# Patient Record
Sex: FEMALE | Race: WHITE | NOT HISPANIC OR LATINO | URBAN - METROPOLITAN AREA
[De-identification: names, ages, dates, MRNs, and addresses within clinical notes are randomized per-mention and may not be internally consistent; named-entity substitution may affect disease eponyms.]

---

## 2019-08-12 ENCOUNTER — OUTPATIENT (OUTPATIENT)
Dept: OUTPATIENT SERVICES | Facility: HOSPITAL | Age: 69
LOS: 1 days | Discharge: HOME | End: 2019-08-12

## 2019-08-12 ENCOUNTER — RESULT REVIEW (OUTPATIENT)
Age: 69
End: 2019-08-12

## 2019-08-13 LAB — SURGICAL PATHOLOGY STUDY: SIGNIFICANT CHANGE UP

## 2022-05-24 ENCOUNTER — NON-APPOINTMENT (OUTPATIENT)
Age: 72
End: 2022-05-24

## 2022-06-09 ENCOUNTER — NON-APPOINTMENT (OUTPATIENT)
Age: 72
End: 2022-06-09

## 2022-06-18 ENCOUNTER — EMERGENCY (EMERGENCY)
Facility: HOSPITAL | Age: 72
LOS: 1 days | Discharge: HOME | End: 2022-06-18
Attending: EMERGENCY MEDICINE | Admitting: EMERGENCY MEDICINE
Payer: COMMERCIAL

## 2022-06-18 VITALS
OXYGEN SATURATION: 99 % | TEMPERATURE: 99 F | WEIGHT: 110.01 LBS | DIASTOLIC BLOOD PRESSURE: 75 MMHG | SYSTOLIC BLOOD PRESSURE: 162 MMHG | HEART RATE: 76 BPM | RESPIRATION RATE: 18 BRPM

## 2022-06-18 DIAGNOSIS — J02.9 ACUTE PHARYNGITIS, UNSPECIFIED: ICD-10-CM

## 2022-06-18 DIAGNOSIS — Z20.822 CONTACT WITH AND (SUSPECTED) EXPOSURE TO COVID-19: ICD-10-CM

## 2022-06-18 LAB
RAPID RVP RESULT: SIGNIFICANT CHANGE UP
S PYO AG SPEC QL IA: NEGATIVE — SIGNIFICANT CHANGE UP
SARS-COV-2 RNA SPEC QL NAA+PROBE: SIGNIFICANT CHANGE UP

## 2022-06-18 PROCEDURE — 87081 CULTURE SCREEN ONLY: CPT

## 2022-06-18 PROCEDURE — 99283 EMERGENCY DEPT VISIT LOW MDM: CPT

## 2022-06-18 PROCEDURE — 0225U NFCT DS DNA&RNA 21 SARSCOV2: CPT

## 2022-06-18 PROCEDURE — 99284 EMERGENCY DEPT VISIT MOD MDM: CPT

## 2022-06-18 PROCEDURE — 87880 STREP A ASSAY W/OPTIC: CPT

## 2022-06-18 RX ORDER — AMOXICILLIN 250 MG/5ML
1 SUSPENSION, RECONSTITUTED, ORAL (ML) ORAL
Qty: 20 | Refills: 0
Start: 2022-06-18 | End: 2022-06-27

## 2022-06-18 NOTE — ED PROVIDER NOTE - CLINICAL SUMMARY MEDICAL DECISION MAKING FREE TEXT BOX
70 y/o F sent in by [facility] for suspected peritonsillar abscess. On my exam, I did not suspect pt has this. Tonsils are slightly erythematous and enlarged. Uvula bilaterally is mildly edematous with no exudate and no fever in ER. Suspect most likely viral pharyngitis. Will consider strep. Discussed with pt. Will send strep and viral cultures and discuss possible Abx. Advised pt to call back this afternoon for results.     Now at f/u, no viral infection and no strep detected. Will call to advise pt to take Amoxicillin if pain is worse or still continuing. Will also review re-emergent return instructions with pt.

## 2022-06-18 NOTE — ED ADULT NURSE NOTE - OBJECTIVE STATEMENT
71y Female c/o sore throat. Airway patent, speaking clear and in full sentences. Pt reports sore throat, difficulty swallowing x "a few days", states "I have a peritonsillar abscess". Pt denies CP, dizziness, SOB, fevers, N/V/D.

## 2022-06-18 NOTE — ED PROVIDER NOTE - OBJECTIVE STATEMENT
72 y/o F, coming in to ED with sore throat x5 days. She reports her sore throat has worsen yesterday with fever of 101F. She notes some improvement since last night. She has difficulty swallowing. She reports getting COVID and strep tests a few days ago with both negative results. She was seen at [facility] where she was advised to come to ED for suspected peritonsillar abscess. She denies n/v/d and urinary sxs.

## 2022-06-18 NOTE — ED ADULT NURSE NOTE - FINAL NURSING ELECTRONIC SIGNATURE
Called Pts name a second time. Pt not in ER waiting room.
Called Pts name a third time. Pt not in ER waiting room. LWBS
Called Pts. Pt not in ER waiting room.
18-Jun-2022 10:24

## 2022-06-18 NOTE — ED ADULT TRIAGE NOTE - CHIEF COMPLAINT QUOTE
Patient c/o sore throat, fever x 1 week, difficulty swallowing since yesterday. No respiratory distress, hoarseness, hemoptysis.

## 2022-06-18 NOTE — ED PROVIDER NOTE - PHYSICAL EXAMINATION
VITAL SIGNS: I have reviewed nursing notes and confirm.  CONSTITUTIONAL: Well-developed; well-nourished; in no acute distress.  SKIN: Agree with RN documentation regarding decubitus evaluation. Remainder of skin exam is warm and dry, no acute rash.  HEAD: Normocephalic; atraumatic.  EYES: PERRL, EOM intact; conjunctiva and sclera clear.  ENT: No nasal discharge; airway clear. Tonsilar inflammation with no exudate. Slightly edematous uvula with glassy sparing edema at superior uvula bilat. Normal voice. No visible peritonsillar abscess. Normal tolerating secretions.   NECK: Supple; non tender.  CARD: S1, S2 normal; no murmurs, gallops, or rubs. Regular rate and rhythm.  RESP: No wheezes, rales or rhonchi. No stridor.   ABD: Normal bowel sounds; soft; non-distended; non-tender; no hepatosplenomegaly.  EXT: Normal ROM. No clubbing, cyanosis or edema.  LYMPH: No acute cervical adenopathy.  NEURO: Alert, oriented. Grossly unremarkable.  PSYCH: Cooperative, appropriate.

## 2022-06-18 NOTE — ED PROVIDER NOTE - PATIENT PORTAL LINK FT
You can access the FollowMyHealth Patient Portal offered by [facility] by registering at the following website: [email or web address]. By joining [facility]’s FollowMyHealth portal, you will also be able to view your health information using other applications (apps) compatible with our system.

## 2022-06-18 NOTE — ED ADULT NURSE NOTE - NSIMPLEMENTINTERV_GEN_ALL_ED
Implemented All Universal Safety Interventions:  [place] to call system. Call bell, personal items and telephone within reach. Instruct patient to call for assistance. Room bathroom lighting operational. Non-slip footwear when patient is off stretcher. Physically safe environment: no spills, clutter or unnecessary equipment. Stretcher in lowest position, wheels locked, appropriate side rails in place.

## 2022-06-18 NOTE — ED PROVIDER NOTE - NSFOLLOWUPINSTRUCTIONS_ED_ALL_ED_FT
Viral and Strep cultures were sent, Please call this afternoon and ask to speak with Dr. Feliciano. Antibiotic decisions will be made with this information    Avoid hot / spicy foods / alcohol / which irritate the inflammation  Take Ibuprofen for discomfort and inflammation  gargle with warm salt water to help decrease inflammation / swelling.         Pharyngitis    Pharyngitis is inflammation of your pharynx, which is typically caused by a viral or bacterial infection. Pharyngitis can be contagious and may spread from person to person through intimate contact, coughing, sneezing, or sharing personal items and utensils. Symptoms of pharyngitis may include sore throat, fever, headache, or swollen lymph nodes. If you are prescribed antibiotics, make sure you finish them even if you start to feel better. Gargle with salt water as often as every 1-2 hours to soothe your throat. Throat lozenges (if you are not at risk for choking) or sprays may be used to soothe your throat.    SEEK IMMEDIATE MEDICAL CARE IF YOU HAVE ANY OF THE FOLLOWING SYMPTOMS: neck stiffness, drooling, hoarseness or change in voice, inability to swallow liquids, vomiting, or trouble breathing.

## 2022-06-22 ENCOUNTER — APPOINTMENT (OUTPATIENT)
Dept: OPHTHALMOLOGY | Facility: CLINIC | Age: 72
End: 2022-06-22

## 2022-07-11 PROBLEM — Z78.9 OTHER SPECIFIED HEALTH STATUS: Chronic | Status: ACTIVE | Noted: 2022-06-18

## 2022-08-02 ENCOUNTER — NON-APPOINTMENT (OUTPATIENT)
Age: 72
End: 2022-08-02

## 2022-08-15 ENCOUNTER — APPOINTMENT (OUTPATIENT)
Dept: BREAST CENTER | Facility: CLINIC | Age: 72
End: 2022-08-15

## 2022-11-02 ENCOUNTER — APPOINTMENT (OUTPATIENT)
Dept: OPHTHALMOLOGY | Facility: CLINIC | Age: 72
End: 2022-11-02

## 2022-11-02 ENCOUNTER — NON-APPOINTMENT (OUTPATIENT)
Age: 72
End: 2022-11-02

## 2022-11-02 PROCEDURE — 92004 COMPRE OPH EXAM NEW PT 1/>: CPT

## 2023-02-15 ENCOUNTER — NON-APPOINTMENT (OUTPATIENT)
Age: 73
End: 2023-02-15

## 2023-03-17 PROBLEM — R92.8 ABNORMAL FINDING ON BREAST IMAGING: Status: ACTIVE | Noted: 2023-03-17

## 2023-03-22 ENCOUNTER — APPOINTMENT (OUTPATIENT)
Dept: BREAST CENTER | Facility: CLINIC | Age: 73
End: 2023-03-22
Payer: COMMERCIAL

## 2023-03-22 ENCOUNTER — APPOINTMENT (OUTPATIENT)
Dept: HEMATOLOGY ONCOLOGY | Facility: CLINIC | Age: 73
End: 2023-03-22

## 2023-03-22 VITALS
HEIGHT: 63 IN | WEIGHT: 114 LBS | DIASTOLIC BLOOD PRESSURE: 82 MMHG | OXYGEN SATURATION: 98 % | HEART RATE: 72 BPM | SYSTOLIC BLOOD PRESSURE: 136 MMHG | BODY MASS INDEX: 20.2 KG/M2

## 2023-03-22 DIAGNOSIS — Z80.3 FAMILY HISTORY OF MALIGNANT NEOPLASM OF BREAST: ICD-10-CM

## 2023-03-22 DIAGNOSIS — Z78.9 OTHER SPECIFIED HEALTH STATUS: ICD-10-CM

## 2023-03-22 DIAGNOSIS — R92.8 OTHER ABNORMAL AND INCONCLUSIVE FINDINGS ON DIAGNOSTIC IMAGING OF BREAST: ICD-10-CM

## 2023-03-22 DIAGNOSIS — Z00.00 ENCOUNTER FOR GENERAL ADULT MEDICAL EXAMINATION W/OUT ABNORMAL FINDINGS: ICD-10-CM

## 2023-03-22 PROCEDURE — 99204 OFFICE O/P NEW MOD 45 MIN: CPT

## 2023-03-22 NOTE — PHYSICAL EXAM
[Supple] : supple [No Supraclavicular Adenopathy] : no supraclavicular adenopathy [Examined in the supine and seated position] : examined in the supine and seated position [No dominant masses] : no dominant masses in right breast  [No Nipple Retraction] : no left nipple retraction [No Nipple Discharge] : no left nipple discharge [No Axillary Lymphadenopathy] : no left axillary lymphadenopathy [Asymmetrical] : asymmetrical

## 2023-03-29 ENCOUNTER — NON-APPOINTMENT (OUTPATIENT)
Age: 73
End: 2023-03-29

## 2023-03-29 NOTE — DISCUSSION/SUMMARY
[FreeTextEntry1] : REASON FOR CONSULT\par Wilbur Cabrera is a 72-year-old female referred by Dr. Johnny Holland for cancer genetic counseling and risk assessment due to a family history of cancer. Ms. Cabrera was seen on 2023 at which time medical and family history was ascertained and a pedigree constructed. She was accompanied by her sister, Mona.\par \par RELEVANT MEDICAL HISTORY\par Ms. Cabrera is a healthy individual with no reported history of cancer. She has a family history of cancer, see below.\par \par OTHER MEDICAL AND SURGICAL HISTORY:\par •	Medical History: no significant medical history reported\par •	Surgical History: hernia surgery\par \par OB/GYN HISTORY:\par Obstetrical History: \par Age at Menarche: 11\par Menopausal Status: Post-menopausal with LMP at age 48 \par Age at First Live Birth: N/A\par Oral Contraceptive Use: No\par Hormone Replacement Therapy: No\par \par CANCER SCREENING HISTORY:  \par Breast: \par •	Mammography: 22- wnl\par •	Sonography: 22- rec 6 month follow up for left breast lesion, 23- rec 6 month follow up\par •	MRI: No\par •	Biopsies: Yes, reportedly benign\par GYN:\par •	Pelvic Examination: Annual- reportedly wnl, history of small fibroids\par Colon:\par •	Colonoscopy: - reportedly 1 polyp identified (pathology unknown), repeat in 7 years\par Skin:  \par •	FBSE: Yes\par •	Lesions biopsied/removed: Yes- basal cell (nose), excised\par \par SOCIAL HISTORY:\par •	Tobacco-product use: Yes, former\par •	Environmental exposures: No\par \par FAMILY HISTORY:\par Maternal ancestry was reported as Eastern /[place] and paternal ancestry was reported as [place]. Maternal and paternal Ashkenazi Jainism ancestry was confirmed. A detailed family history of cancer was ascertained, see below and scanned chart for pedigree. \par \par 	Of note, Ms. Cabrera’s sister, Mona, reported a history of 3 separate breast cancers: left breast cancer diagnosed age 36 s/p lumpectomy and radiation, right invasive lobular cancer (HR+) diagnosed age 49 s/p mastectomy, left invasive tubular cancer diagnosed age 57 s/p mastectomy and endocrine therapy (exemestane). She initially pursued genetic testing in  which was negative for any variants in BRCA1/BRCA2. She has not pursued update testing and is not interested at this time. \par \par Additionally, her maternal cousin also recently pursued genetic testing due to the family history of cancer with results pending. \par \par According to Ms. Cabrera no one else in the family has had germline testing for cancer susceptibility. Consanguinity was denied. \par 	\par RISK ASSESSMENT:\par Ms. Cabrera’s family history is suggestive of a hereditary cancer syndrome given her sister’s multiple breast cancers under the age of 50, father with metastatic prostate cancer, paternal cousin with ovarian cancer, maternal cousin with pancreatic cancer, and maternal grandmother with breast cancer in her late 40s. The patient meets National Comprehensive Cancer Network (NCCN) criteria for genetic testing. We recommended genetic testing for genes associated with breast and gynecological cancer. Patient declined testing for genes associated with pancreatic cancer. This test analyzes 19 genes: ANASTASIA, BARD1, BRCA1, BRCA2, BRIP1, CDH1, CHEK2, EPCAM, MLH1, MSH2, MSH6, NF1, PALB2, PMS2, PTEN, RAD51C, RAD51D, STK11, and TP53.\par \par The risks, benefits and limitations of genetic testing were discussed with Ms. Cabrera. In addition, we discussed the purpose of genetic testing and possible test results (positive, negative, inconclusive) along with associated medical management options and psychosocial implications. Insurance coverage and potential out of pocket costs were also discussed. The Genetic Information Non-discrimination Act (SHARIFA) was reviewed.\par \par It was explained that risk assessment is based upon medical and family history as provided and may change in the future should new information be obtained. \par \par Following our discussion, Ms. Cabrera consented to the above-mentioned genetic testing panel. Blood was drawn in our laboratory and sent to [facility] today.\par \par PLAN:\par \par 1.	Blood drawn today will be sent to [facility] for analysis. \par 2.	We will contact Ms. Cabrera to schedule a follow-up appointment once the results are available. Results generally return in 2-3 weeks. \par \par For any additional questions please call Cancer Genetics at [phone]. \par \par \par Amanda Wilkinson MS, [facility] [place]\par Genetic Counselor, Cancer Genetics\par \par

## 2023-03-29 NOTE — DISCUSSION/SUMMARY
[FreeTextEntry1] : REASON FOR CONSULT\par Wilbur Cabrera is a 72-year-old female who was contacted on March 29, 2023 for a discussion regarding her negative genetic testing results related to hereditary cancer predisposition. This session was conducted via telephone. \par \par Ms. Cabrera was originally seen by the Cancer Genetics Service on March 22, 2023 for hereditary cancer predisposition risk assessment due to a family history of cancer. At that time, Ms. Cabrera decided to pursue genetic testing for genes associated with breast and gynecological cancers offered by [facility].\par \par TEST RESULTS: NEGATIVE\par NO pathogenic (disease-causing) variants or variants of uncertain significance were detected in any of the following genes [19]:  ANASTASIA, BARD1, BRCA1, BRCA2, BRIP1, CDH1, CHEK2, EPCAM, MLH1, MSH2, MSH6, NF1, PALB2, PMS2, PTEN, RAD51C, RAD51D, STK11, and TP53.\par \par RESULTS INTERPRETATION AND ASSESSMENT:\par Given Ms. Cabrera’s personal and current reported family history of cancer, and her negative genetic test results, the following screening guidelines and risk-reducing recommendations were discussed:\par \par OTHER:\par •	In the absence of other indications, Ms. Cabrera should practice age-appropriate cancer screening of other organ systems as recommended for the general population.\par \par We also discussed the limitations of negative results:\par 1.	The cause of Ms. Cabrera’s family history of cancer remains unknown. The cancer(s) may have developed randomly, or due to environmental factors.  \par 2.	This negative result does not completely rule out a hereditary basis for the reported personal and/or family history due to limitations in technology or a variant being present in an unidentified gene. \par 3.	Variants in other genes would not be identified by this analysis, so this negative result does not rule out the likelihood of having a mutation in a different hereditary cancer gene or the possibility of ever developing cancer.\par 4.	It is possible there is a hereditary cancer predisposition gene mutation in the family, but the patient did not inherit it. \par \par We informed Ms. Cabrera that our knowledge of genetics and inherited cancer conditions is changing rapidly. Therefore, we recommended that Ms. Cabrera contact our office, every 2 to 3 years, to discuss relevant advances in cancer genetics.  We emphasized the importance of re-contacting us with updates regarding her personal and family history of cancer as well as any updates regarding additional cancer genetic test results performed for the patient and/or family members.  Such updates could possibly change our risk assessment and recommendations. \par \par In addition, we again discussed Ms. Cabrera’s sister consider pursuing cancer risk assessment genetic counseling with the option of update genetic testing. \par \par PLAN:\par 1.	These results do not change Ms. Cabrera’s medical management. \par 2.	Patient informed consult note(s) will be available through their [facility] patient portal and genetic test results will be released via [facility]’s portal.\par 3.	Ms. Cabrera was encouraged to contact us every 2-3 years to discuss relevant advances in cancer genetics, or sooner if there are any changes in her personal or family history of cancer.\par \par \par For any additional questions please call Cancer Genetics at [phone]. \par \par \par Amanda Wilkinson MS, [facility] [place]par Genetic Counselor, Cancer Genetics\par \par \par \par

## 2023-03-31 NOTE — ASSESSMENT
[FreeTextEntry1] : 72 year old female presents for initial evaluation of BIRADS 3 imaging. Most recent imaging reviewed: targeted L US 2/6/23 BIRADS-3 revealed a 0.4cm hypoechoic nodule of the LEFT breast 12:00 3cmfn, similar in size dating back to 7/31/22 when it was first noted on breast US. SIM not elevated. Physical exam WNL. Plan for B/L sMMG/US and re-examination in Aug 2023. Patient will meet with genetic counselor today to discuss testing. Patient verbalizes understanding and is in agreement with the plan.\par

## 2023-03-31 NOTE — DATA REVIEWED
[FreeTextEntry1] : 7/31/22 (R) B/L sMMG/US: heterogeneously dense. \par -Examination the right breast reveals a circumscribed elongated hypoechoic nodule measuring 6 x 6 x 3 mm, unchanged compared with the prior study. \par -left breast reveals a hypoechoic 2 x 3 mm nodule at 12:00 3cmfn, not seen previously but has a benign appearance \par FOLLOW-UP: New small left breast nodule which is probably benign. Six-month follow-up left breast ultrasound advised.  BI-RADS 3:  Probably benign. \par \par 2/4/22 ([facility]) Targeted L US: 12:00 region of the left breast 3cmfn again demonstrates a horizontally oriented hypoechoic lesion measuring 4 x 2 x 4 mm not significantly changed allowing for technical differences. FOLLOW-UP: continued follow-up in July 2022 when the patient is due for her yearly mammogram. BI-RADS 3:  Probably benign. \par \par 8/5/22 ([facility]) B/L sMMG/US: heterogeneously dense. Stable postsurgical distortion noted bilaterally. \par -The previously noted small hypoechoic lesion in the left breast at 12:00 3 cmfn appears stable measuring 0.4 x 0.1 x 0.3 cm.\par -In the right breast 11:00 6 cmfn is a stable well marginated hypoechoic horizontally oriented lesion measuring 0.7 x 0.3 x 0.6 cm; this has been stable for several years.\par FOLLOW-UP: short-term follow-up sonogram in 6 months for the tiny hypoechoic lesion in the left breast at 12:00 to assess for 2 years of stability. BI-RADS 3:  Probably benign. \par \par 2/6/23 (R) Targeted L US: In the 12:00 axis of the left breast, 3cmfn, 0.4 x 0.2 x 0.4 cm hypoechoic nodule is present. The size is similar dating back to 7/31/2021. FOLLOW-UP: Six-month follow-up is recommended at the time the patient's next screening mammogram, which is due on or after 8/6/2023. Distal provide for total of 2 years assessment of the nodule. BI-RADS 3:  Probably benign.

## 2023-03-31 NOTE — PAST MEDICAL HISTORY
[Menarche Age ____] : age at menarche was [unfilled] [Menopause Age____] : age at menopause was [unfilled] [FreeTextEntry5] : no [FreeTextEntry6] : no [FreeTextEntry7] : no [FreeTextEntry8] : no

## 2023-03-31 NOTE — HISTORY OF PRESENT ILLNESS
[FreeTextEntry1] : 72 year old female was referred by Dr. Marino who presents for initial evaluation regarding left breast nodule found on screening imaging. Most recent imaging: targeted L US 2/6/23 BIRADS-3 revealed a 0.4cm hypoechoic nodule of the LEFT breast 12:00 3cmfn, similar in size dating back to 7/31/22 when it was first noted on breast US, with recommendation for 6 month follow-up at the time the patient's next screening mammogram, which is due on or after 8/6/2023, and would provide a total of 2 years assessment of the nodule. \par \par Patient reports history of bilateral excisional breast biopsies. Patient denies palpable masses, nipple discharge, skin changes. Patient reports family history of breast cancer in sister age 35, 49, and 57, genetic tested negative in 2000s. MGM breast cancer @ age 52. Denies famhx of ovarian cancer. Patient has not had genetic testing performed\par \par [place]da Lifetime Risk 9.9%\par \par

## 2023-08-09 ENCOUNTER — APPOINTMENT (OUTPATIENT)
Dept: BREAST CENTER | Facility: CLINIC | Age: 73
End: 2023-08-09

## 2023-11-08 ENCOUNTER — TRANSCRIPTION ENCOUNTER (OUTPATIENT)
Age: 73
End: 2023-11-08

## 2023-11-08 ENCOUNTER — APPOINTMENT (OUTPATIENT)
Dept: OPHTHALMOLOGY | Facility: CLINIC | Age: 73
End: 2023-11-08
Payer: COMMERCIAL

## 2023-11-08 ENCOUNTER — NON-APPOINTMENT (OUTPATIENT)
Age: 73
End: 2023-11-08

## 2023-11-08 PROCEDURE — 92134 CPTRZ OPH DX IMG PST SGM RTA: CPT

## 2023-11-08 PROCEDURE — 92014 COMPRE OPH EXAM EST PT 1/>: CPT

## 2024-04-10 ENCOUNTER — APPOINTMENT (OUTPATIENT)
Dept: OPHTHALMOLOGY | Facility: CLINIC | Age: 74
End: 2024-04-10

## 2024-04-10 ENCOUNTER — NON-APPOINTMENT (OUTPATIENT)
Age: 74
End: 2024-04-10

## 2024-04-10 ENCOUNTER — APPOINTMENT (OUTPATIENT)
Dept: OPHTHALMOLOGY | Facility: CLINIC | Age: 74
End: 2024-04-10
Payer: COMMERCIAL

## 2024-04-10 PROCEDURE — 92012 INTRM OPH EXAM EST PATIENT: CPT

## 2024-11-13 ENCOUNTER — APPOINTMENT (OUTPATIENT)
Dept: OPHTHALMOLOGY | Facility: CLINIC | Age: 74
End: 2024-11-13
Payer: COMMERCIAL

## 2024-11-13 ENCOUNTER — NON-APPOINTMENT (OUTPATIENT)
Age: 74
End: 2024-11-13

## 2024-11-13 PROCEDURE — 92014 COMPRE OPH EXAM EST PT 1/>: CPT

## 2025-01-20 ENCOUNTER — NON-APPOINTMENT (OUTPATIENT)
Age: 75
End: 2025-01-20

## 2025-01-29 ENCOUNTER — APPOINTMENT (OUTPATIENT)
Dept: OTOLARYNGOLOGY | Facility: CLINIC | Age: 75
End: 2025-01-29

## 2025-01-29 ENCOUNTER — NON-APPOINTMENT (OUTPATIENT)
Age: 75
End: 2025-01-29

## 2025-01-29 VITALS — HEIGHT: 63 IN | WEIGHT: 115.2 LBS | BODY MASS INDEX: 20.41 KG/M2

## 2025-01-29 DIAGNOSIS — Z87.891 PERSONAL HISTORY OF NICOTINE DEPENDENCE: ICD-10-CM

## 2025-01-29 DIAGNOSIS — Z92.89 PERSONAL HISTORY OF OTHER MEDICAL TREATMENT: ICD-10-CM

## 2025-01-29 DIAGNOSIS — H61.23 IMPACTED CERUMEN, BILATERAL: ICD-10-CM

## 2025-01-29 DIAGNOSIS — R42 DIZZINESS AND GIDDINESS: ICD-10-CM

## 2025-05-21 ENCOUNTER — NON-APPOINTMENT (OUTPATIENT)
Age: 75
End: 2025-05-21

## 2025-05-21 ENCOUNTER — APPOINTMENT (OUTPATIENT)
Dept: OPHTHALMOLOGY | Facility: CLINIC | Age: 75
End: 2025-05-21
Payer: COMMERCIAL

## 2025-05-21 PROCEDURE — 92134 CPTRZ OPH DX IMG PST SGM RTA: CPT

## 2025-05-21 PROCEDURE — 92012 INTRM OPH EXAM EST PATIENT: CPT

## 2025-05-21 PROCEDURE — 92136 OPHTHALMIC BIOMETRY: CPT

## 2025-08-24 ENCOUNTER — EMERGENCY (EMERGENCY)
Facility: HOSPITAL | Age: 75
LOS: 1 days | End: 2025-08-24
Attending: EMERGENCY MEDICINE | Admitting: EMERGENCY MEDICINE
Payer: COMMERCIAL

## 2025-08-24 VITALS
OXYGEN SATURATION: 100 % | HEART RATE: 64 BPM | DIASTOLIC BLOOD PRESSURE: 80 MMHG | TEMPERATURE: 98 F | RESPIRATION RATE: 20 BRPM | WEIGHT: 113.98 LBS | SYSTOLIC BLOOD PRESSURE: 157 MMHG

## 2025-08-24 VITALS
DIASTOLIC BLOOD PRESSURE: 81 MMHG | TEMPERATURE: 98 F | OXYGEN SATURATION: 99 % | RESPIRATION RATE: 18 BRPM | HEART RATE: 68 BPM | SYSTOLIC BLOOD PRESSURE: 149 MMHG

## 2025-08-24 LAB
ANION GAP SERPL CALC-SCNC: 11 MMOL/L — SIGNIFICANT CHANGE UP (ref 5–17)
APPEARANCE UR: CLEAR — SIGNIFICANT CHANGE UP
BILIRUB UR-MCNC: NEGATIVE — SIGNIFICANT CHANGE UP
BUN SERPL-MCNC: 12 MG/DL — SIGNIFICANT CHANGE UP (ref 7–23)
CALCIUM SERPL-MCNC: 9.6 MG/DL — SIGNIFICANT CHANGE UP (ref 8.4–10.5)
CHLORIDE SERPL-SCNC: 101 MMOL/L — SIGNIFICANT CHANGE UP (ref 96–108)
CO2 SERPL-SCNC: 26 MMOL/L — SIGNIFICANT CHANGE UP (ref 22–31)
COLOR SPEC: YELLOW — SIGNIFICANT CHANGE UP
CREAT SERPL-MCNC: 0.77 MG/DL — SIGNIFICANT CHANGE UP (ref 0.5–1.3)
DIFF PNL FLD: NEGATIVE — SIGNIFICANT CHANGE UP
EGFR: 81 ML/MIN/1.73M2 — SIGNIFICANT CHANGE UP
EGFR: 81 ML/MIN/1.73M2 — SIGNIFICANT CHANGE UP
GLUCOSE SERPL-MCNC: 107 MG/DL — HIGH (ref 70–99)
GLUCOSE UR QL: NEGATIVE MG/DL — SIGNIFICANT CHANGE UP
HCT VFR BLD CALC: 43.9 % — SIGNIFICANT CHANGE UP (ref 34.5–45)
HGB BLD-MCNC: 14.4 G/DL — SIGNIFICANT CHANGE UP (ref 11.5–15.5)
KETONES UR QL: 15 MG/DL
LEUKOCYTE ESTERASE UR-ACNC: NEGATIVE — SIGNIFICANT CHANGE UP
MCHC RBC-ENTMCNC: 30.4 PG — SIGNIFICANT CHANGE UP (ref 27–34)
MCHC RBC-ENTMCNC: 32.8 G/DL — SIGNIFICANT CHANGE UP (ref 32–36)
MCV RBC AUTO: 92.8 FL — SIGNIFICANT CHANGE UP (ref 80–100)
NITRITE UR-MCNC: NEGATIVE — SIGNIFICANT CHANGE UP
NRBC # BLD AUTO: 0 K/UL — SIGNIFICANT CHANGE UP (ref 0–0)
NRBC # FLD: 0 K/UL — SIGNIFICANT CHANGE UP (ref 0–0)
NRBC BLD AUTO-RTO: 0 /100 WBCS — SIGNIFICANT CHANGE UP (ref 0–0)
PH UR: 6.5 — SIGNIFICANT CHANGE UP (ref 5–8)
PLATELET # BLD AUTO: 352 K/UL — SIGNIFICANT CHANGE UP (ref 150–400)
PMV BLD: 10.2 FL — SIGNIFICANT CHANGE UP (ref 7–13)
POTASSIUM SERPL-MCNC: 4.1 MMOL/L — SIGNIFICANT CHANGE UP (ref 3.5–5.3)
POTASSIUM SERPL-SCNC: 4.1 MMOL/L — SIGNIFICANT CHANGE UP (ref 3.5–5.3)
PROT UR-MCNC: NEGATIVE MG/DL — SIGNIFICANT CHANGE UP
RBC # BLD: 4.73 M/UL — SIGNIFICANT CHANGE UP (ref 3.8–5.2)
RBC # FLD: 13.2 % — SIGNIFICANT CHANGE UP (ref 10.3–14.5)
SODIUM SERPL-SCNC: 138 MMOL/L — SIGNIFICANT CHANGE UP (ref 135–145)
SP GR SPEC: 1.01 — SIGNIFICANT CHANGE UP (ref 1–1.03)
UROBILINOGEN FLD QL: 0.2 MG/DL — SIGNIFICANT CHANGE UP (ref 0.2–1)
WBC # BLD: 10.17 K/UL — SIGNIFICANT CHANGE UP (ref 3.8–10.5)
WBC # FLD AUTO: 10.17 K/UL — SIGNIFICANT CHANGE UP (ref 3.8–10.5)

## 2025-08-24 PROCEDURE — 99285 EMERGENCY DEPT VISIT HI MDM: CPT

## 2025-08-24 PROCEDURE — 73502 X-RAY EXAM HIP UNI 2-3 VIEWS: CPT

## 2025-08-24 PROCEDURE — 74177 CT ABD & PELVIS W/CONTRAST: CPT | Mod: 26

## 2025-08-24 PROCEDURE — 85027 COMPLETE CBC AUTOMATED: CPT

## 2025-08-24 PROCEDURE — 81003 URINALYSIS AUTO W/O SCOPE: CPT

## 2025-08-24 PROCEDURE — 99284 EMERGENCY DEPT VISIT MOD MDM: CPT | Mod: 25

## 2025-08-24 PROCEDURE — 74177 CT ABD & PELVIS W/CONTRAST: CPT

## 2025-08-24 PROCEDURE — 73502 X-RAY EXAM HIP UNI 2-3 VIEWS: CPT | Mod: 26,RT

## 2025-08-24 PROCEDURE — 80048 BASIC METABOLIC PNL TOTAL CA: CPT

## 2025-08-24 PROCEDURE — 36415 COLL VENOUS BLD VENIPUNCTURE: CPT

## 2025-08-24 PROCEDURE — 36000 PLACE NEEDLE IN VEIN: CPT | Mod: XU

## 2025-08-24 RX ORDER — IOHEXOL 350 MG/ML
30 INJECTION, SOLUTION INTRAVENOUS ONCE
Refills: 0 | Status: COMPLETED | OUTPATIENT
Start: 2025-08-24 | End: 2025-08-24

## 2025-08-24 RX ORDER — ONDANSETRON HCL/PF 4 MG/2 ML
4 VIAL (ML) INJECTION ONCE
Refills: 0 | Status: DISCONTINUED | OUTPATIENT
Start: 2025-08-24 | End: 2025-08-27

## 2025-08-24 RX ADMIN — Medication 1000 MILLILITER(S): at 10:44

## 2025-08-24 RX ADMIN — IOHEXOL 30 MILLILITER(S): 350 INJECTION, SOLUTION INTRAVENOUS at 10:44

## 2025-08-25 PROBLEM — Z78.9 OTHER SPECIFIED HEALTH STATUS: Chronic | Status: INACTIVE | Noted: 2022-06-18 | Resolved: 2025-08-24

## 2025-08-26 DIAGNOSIS — R14.0 ABDOMINAL DISTENSION (GASEOUS): ICD-10-CM
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
## 2025-08-26 DIAGNOSIS — M54.50 LOW BACK PAIN, UNSPECIFIED: ICD-10-CM

## 2025-08-26 DIAGNOSIS — R10.813 RIGHT LOWER QUADRANT ABDOMINAL TENDERNESS: ICD-10-CM

## 2025-08-26 DIAGNOSIS — R10.31 RIGHT LOWER QUADRANT PAIN: ICD-10-CM

## 2025-08-26 DIAGNOSIS — R00.2 PALPITATIONS: ICD-10-CM

## 2025-08-26 DIAGNOSIS — R53.81 OTHER MALAISE: ICD-10-CM
